# Patient Record
Sex: FEMALE | Race: WHITE | URBAN - METROPOLITAN AREA
[De-identification: names, ages, dates, MRNs, and addresses within clinical notes are randomized per-mention and may not be internally consistent; named-entity substitution may affect disease eponyms.]

---

## 2017-08-24 ENCOUNTER — OFFICE VISIT (OUTPATIENT)
Dept: INTERNAL MEDICINE CLINIC | Facility: CLINIC | Age: 22
End: 2017-08-24

## 2017-08-24 VITALS
HEIGHT: 70 IN | WEIGHT: 167 LBS | DIASTOLIC BLOOD PRESSURE: 67 MMHG | BODY MASS INDEX: 23.91 KG/M2 | SYSTOLIC BLOOD PRESSURE: 118 MMHG | HEART RATE: 81 BPM

## 2017-08-24 DIAGNOSIS — Z02.5 SPORTS PHYSICAL: Primary | ICD-10-CM

## 2017-08-24 PROCEDURE — 99385 PREV VISIT NEW AGE 18-39: CPT | Performed by: INTERNAL MEDICINE

## 2017-08-24 NOTE — PROGRESS NOTES
Malorie Dejesus is a 23year old female.   Patient presents with:  Sports Physical      HPI:   Pt is a 22 yo woman comes as a new pt   Used to see dr Atif Juares in [facility] -- will sin for release of info   C/c physical   Needs a phsical as she is going back t edema, all jnts including hands feet and toes ok     ASSESSMENT AND PLAN:   Diagnoses and all orders for this visit:    Sports physical  -     CBC WITH DIFFERENTIAL WITH PLATELET; Future  -     COMP METABOLIC PANEL (14);  Future  -     TSH W REFLEX TO FREE

## (undated) NOTE — LETTER
05/02/18        Concetta Blackmon  [address] [facility]      Dear Jostin Zimmer,    Our records indicate that you have outstanding lab work and or testing that was ordered for you and has not yet been completed:          CBC W Differential W Platelet [E

## (undated) NOTE — LETTER
12/27/17        Concetta Blackmon  [address] [facility]      Dear Kota Tompkins,    Our records indicate that you have outstanding lab work and or testing that was ordered for you and has not yet been completed:          CBC W Differential W Platelet [E